# Patient Record
Sex: FEMALE | Race: BLACK OR AFRICAN AMERICAN | NOT HISPANIC OR LATINO | URBAN - METROPOLITAN AREA
[De-identification: names, ages, dates, MRNs, and addresses within clinical notes are randomized per-mention and may not be internally consistent; named-entity substitution may affect disease eponyms.]

---

## 2018-10-08 ENCOUNTER — EMERGENCY (EMERGENCY)
Facility: HOSPITAL | Age: 38
LOS: 0 days | Discharge: HOME | End: 2018-10-08
Attending: EMERGENCY MEDICINE

## 2018-10-08 VITALS
DIASTOLIC BLOOD PRESSURE: 77 MMHG | RESPIRATION RATE: 18 BRPM | TEMPERATURE: 97 F | OXYGEN SATURATION: 100 % | SYSTOLIC BLOOD PRESSURE: 140 MMHG | HEART RATE: 86 BPM

## 2018-10-08 DIAGNOSIS — E89.0 POSTPROCEDURAL HYPOTHYROIDISM: Chronic | ICD-10-CM

## 2018-10-08 DIAGNOSIS — J45.909 UNSPECIFIED ASTHMA, UNCOMPLICATED: ICD-10-CM

## 2018-10-08 DIAGNOSIS — R10.12 LEFT UPPER QUADRANT PAIN: ICD-10-CM

## 2018-10-08 DIAGNOSIS — R35.0 FREQUENCY OF MICTURITION: ICD-10-CM

## 2018-10-08 DIAGNOSIS — E89.0 POSTPROCEDURAL HYPOTHYROIDISM: ICD-10-CM

## 2018-10-08 DIAGNOSIS — R10.9 UNSPECIFIED ABDOMINAL PAIN: ICD-10-CM

## 2018-10-08 DIAGNOSIS — R42 DIZZINESS AND GIDDINESS: ICD-10-CM

## 2018-10-08 DIAGNOSIS — Z88.6 ALLERGY STATUS TO ANALGESIC AGENT: ICD-10-CM

## 2018-10-08 DIAGNOSIS — Z79.899 OTHER LONG TERM (CURRENT) DRUG THERAPY: ICD-10-CM

## 2018-10-08 DIAGNOSIS — Z98.890 OTHER SPECIFIED POSTPROCEDURAL STATES: ICD-10-CM

## 2018-10-08 DIAGNOSIS — M77.8 OTHER ENTHESOPATHIES, NOT ELSEWHERE CLASSIFIED: Chronic | ICD-10-CM

## 2018-10-08 DIAGNOSIS — Z91.018 ALLERGY TO OTHER FOODS: ICD-10-CM

## 2018-10-08 LAB
ALBUMIN SERPL ELPH-MCNC: 4.5 G/DL — SIGNIFICANT CHANGE UP (ref 3.5–5.2)
ALP SERPL-CCNC: 68 U/L — SIGNIFICANT CHANGE UP (ref 30–115)
ALT FLD-CCNC: 14 U/L — SIGNIFICANT CHANGE UP (ref 0–41)
ANION GAP SERPL CALC-SCNC: 12 MMOL/L — SIGNIFICANT CHANGE UP (ref 7–14)
APPEARANCE UR: CLEAR — SIGNIFICANT CHANGE UP
AST SERPL-CCNC: 14 U/L — SIGNIFICANT CHANGE UP (ref 0–41)
BASOPHILS # BLD AUTO: 0.04 K/UL — SIGNIFICANT CHANGE UP (ref 0–0.2)
BASOPHILS NFR BLD AUTO: 0.3 % — SIGNIFICANT CHANGE UP (ref 0–1)
BILIRUB DIRECT SERPL-MCNC: <0.2 MG/DL — SIGNIFICANT CHANGE UP (ref 0–0.2)
BILIRUB INDIRECT FLD-MCNC: SIGNIFICANT CHANGE UP MG/DL (ref 0.2–1.2)
BILIRUB SERPL-MCNC: <0.2 MG/DL — SIGNIFICANT CHANGE UP (ref 0.2–1.2)
BILIRUB UR-MCNC: NEGATIVE — SIGNIFICANT CHANGE UP
BUN SERPL-MCNC: 12 MG/DL — SIGNIFICANT CHANGE UP (ref 10–20)
CALCIUM SERPL-MCNC: 9.7 MG/DL — SIGNIFICANT CHANGE UP (ref 8.5–10.1)
CHLORIDE SERPL-SCNC: 100 MMOL/L — SIGNIFICANT CHANGE UP (ref 98–110)
CO2 SERPL-SCNC: 28 MMOL/L — SIGNIFICANT CHANGE UP (ref 17–32)
COLOR SPEC: YELLOW — SIGNIFICANT CHANGE UP
CREAT SERPL-MCNC: 0.8 MG/DL — SIGNIFICANT CHANGE UP (ref 0.7–1.5)
D DIMER BLD IA.RAPID-MCNC: 71 NG/ML DDU — SIGNIFICANT CHANGE UP (ref 0–230)
DIFF PNL FLD: NEGATIVE — SIGNIFICANT CHANGE UP
EOSINOPHIL # BLD AUTO: 0.15 K/UL — SIGNIFICANT CHANGE UP (ref 0–0.7)
EOSINOPHIL NFR BLD AUTO: 1.3 % — SIGNIFICANT CHANGE UP (ref 0–8)
GLUCOSE SERPL-MCNC: 113 MG/DL — HIGH (ref 70–99)
GLUCOSE UR QL: NEGATIVE MG/DL — SIGNIFICANT CHANGE UP
HCT VFR BLD CALC: 37.1 % — SIGNIFICANT CHANGE UP (ref 37–47)
HGB BLD-MCNC: 12.7 G/DL — SIGNIFICANT CHANGE UP (ref 12–16)
IMM GRANULOCYTES NFR BLD AUTO: 0.3 % — SIGNIFICANT CHANGE UP (ref 0.1–0.3)
KETONES UR-MCNC: NEGATIVE — SIGNIFICANT CHANGE UP
LACTATE SERPL-SCNC: 1.1 MMOL/L — SIGNIFICANT CHANGE UP (ref 0.5–2.2)
LEUKOCYTE ESTERASE UR-ACNC: NEGATIVE — SIGNIFICANT CHANGE UP
LIDOCAIN IGE QN: 23 U/L — SIGNIFICANT CHANGE UP (ref 7–60)
LYMPHOCYTES # BLD AUTO: 27.7 % — SIGNIFICANT CHANGE UP (ref 20.5–51.1)
LYMPHOCYTES # BLD AUTO: 3.31 K/UL — SIGNIFICANT CHANGE UP (ref 1.2–3.4)
MCHC RBC-ENTMCNC: 28 PG — SIGNIFICANT CHANGE UP (ref 27–31)
MCHC RBC-ENTMCNC: 34.2 G/DL — SIGNIFICANT CHANGE UP (ref 32–37)
MCV RBC AUTO: 81.7 FL — SIGNIFICANT CHANGE UP (ref 81–99)
MONOCYTES # BLD AUTO: 0.64 K/UL — HIGH (ref 0.1–0.6)
MONOCYTES NFR BLD AUTO: 5.4 % — SIGNIFICANT CHANGE UP (ref 1.7–9.3)
NEUTROPHILS # BLD AUTO: 7.76 K/UL — HIGH (ref 1.4–6.5)
NEUTROPHILS NFR BLD AUTO: 65 % — SIGNIFICANT CHANGE UP (ref 42.2–75.2)
NITRITE UR-MCNC: NEGATIVE — SIGNIFICANT CHANGE UP
PH UR: 5.5 — SIGNIFICANT CHANGE UP (ref 5–8)
PLATELET # BLD AUTO: 333 K/UL — SIGNIFICANT CHANGE UP (ref 130–400)
POTASSIUM SERPL-MCNC: 4.8 MMOL/L — SIGNIFICANT CHANGE UP (ref 3.5–5)
POTASSIUM SERPL-SCNC: 4.8 MMOL/L — SIGNIFICANT CHANGE UP (ref 3.5–5)
PROT SERPL-MCNC: 6.9 G/DL — SIGNIFICANT CHANGE UP (ref 6–8)
PROT UR-MCNC: NEGATIVE MG/DL — SIGNIFICANT CHANGE UP
RBC # BLD: 4.54 M/UL — SIGNIFICANT CHANGE UP (ref 4.2–5.4)
RBC # FLD: 12.8 % — SIGNIFICANT CHANGE UP (ref 11.5–14.5)
SODIUM SERPL-SCNC: 140 MMOL/L — SIGNIFICANT CHANGE UP (ref 135–146)
SP GR SPEC: 1.02 — SIGNIFICANT CHANGE UP (ref 1.01–1.03)
TYPE + AB SCN PNL BLD: SIGNIFICANT CHANGE UP
UROBILINOGEN FLD QL: 0.2 MG/DL — SIGNIFICANT CHANGE UP (ref 0.2–0.2)
WBC # BLD: 11.94 K/UL — HIGH (ref 4.8–10.8)
WBC # FLD AUTO: 11.94 K/UL — HIGH (ref 4.8–10.8)

## 2018-10-08 RX ORDER — MORPHINE SULFATE 50 MG/1
4 CAPSULE, EXTENDED RELEASE ORAL ONCE
Qty: 0 | Refills: 0 | Status: DISCONTINUED | OUTPATIENT
Start: 2018-10-08 | End: 2018-10-08

## 2018-10-08 RX ORDER — SODIUM CHLORIDE 9 MG/ML
1000 INJECTION INTRAMUSCULAR; INTRAVENOUS; SUBCUTANEOUS ONCE
Qty: 0 | Refills: 0 | Status: COMPLETED | OUTPATIENT
Start: 2018-10-08 | End: 2018-10-08

## 2018-10-08 RX ORDER — FAMOTIDINE 10 MG/ML
20 INJECTION INTRAVENOUS ONCE
Qty: 0 | Refills: 0 | Status: COMPLETED | OUTPATIENT
Start: 2018-10-08 | End: 2018-10-08

## 2018-10-08 RX ORDER — LEVOTHYROXINE SODIUM 125 MCG
1 TABLET ORAL
Qty: 0 | Refills: 0 | COMMUNITY

## 2018-10-08 RX ADMIN — MORPHINE SULFATE 4 MILLIGRAM(S): 50 CAPSULE, EXTENDED RELEASE ORAL at 21:01

## 2018-10-08 RX ADMIN — FAMOTIDINE 20 MILLIGRAM(S): 10 INJECTION INTRAVENOUS at 21:01

## 2018-10-08 RX ADMIN — SODIUM CHLORIDE 1000 MILLILITER(S): 9 INJECTION INTRAMUSCULAR; INTRAVENOUS; SUBCUTANEOUS at 21:33

## 2018-10-08 RX ADMIN — SODIUM CHLORIDE 2000 MILLILITER(S): 9 INJECTION INTRAMUSCULAR; INTRAVENOUS; SUBCUTANEOUS at 21:01

## 2018-10-08 RX ADMIN — MORPHINE SULFATE 4 MILLIGRAM(S): 50 CAPSULE, EXTENDED RELEASE ORAL at 23:01

## 2018-10-08 RX ADMIN — MORPHINE SULFATE 4 MILLIGRAM(S): 50 CAPSULE, EXTENDED RELEASE ORAL at 21:52

## 2018-10-08 RX ADMIN — Medication 30 MILLILITER(S): at 21:01

## 2018-10-08 NOTE — ED PROVIDER NOTE - PHYSICAL EXAMINATION
GEN: Nontoxic, appearing in pain.    HEAD:  Normocephalic, atraumatic.    EYES:  Clear conjunctivae without injection, drainage or discharge.    ENMT:  Moist MM.    NECK:  Supple, no masses. Normal ROM.    CARDIAC:  RRR, normal S1 and S2, no murmurs, rubs or gallops.    RESP:  Respiratory rate and effort appear normal; lungs are clear to auscultation bilaterally; no rhonchi, rales or wheezes.    ABDOMEN:  Soft, (+) LUQ abdominal tenderness (+) guarding no rebound, non-distended, no masses. Normal BS throughout.    MUSCULOSKELETAL: No leg swelling, no calf tenderness. Patient able to ambulate without difficulty.  NEURO:  AAO x 3. Motor 5/5. Sensory intact.     SKIN:  Normal skin color for age and race, well-perfused; warm and dry.

## 2018-10-08 NOTE — ED PROVIDER NOTE - OBJECTIVE STATEMENT
37 yo female with PMHx vonWillebrand disease, anemia, asthma, thyroidectomy on synthroid presents for LUQ abdominal pain x 4 days and dizziness beginning today. Patient sates pain is jenni and severe 10/10. LMP Sept 31. Patient finished course of Augmentin x 7 days 5 days ago for sinusitis which resolved. Patient was seen in [email or web address] 2 days ago and was found to have small amount of blood in urine and had non contrast abdominal CT which was negative. Pain has been worsening since then. Patient has been trying tums without relief (+) urinary frequency. Patient denies fevers, chest pain, SOB, urinary urgency, burning, nausea, vomiting, diarrhea, constipation, weakness, recent travel, leg pain/swelling, changes in PO intake, hematuria, changes in urine output, changes in mental status.

## 2018-10-08 NOTE — ED ADULT NURSE NOTE - NSIMPLEMENTINTERV_GEN_ALL_ED
Implemented All Universal Safety Interventions:  [place] to call system. Call bell, personal items and telephone within reach. Instruct patient to call for assistance. Room bathroom lighting operational. Non-slip footwear when patient is off stretcher. Physically safe environment: no spills, clutter or unnecessary equipment. Stretcher in lowest position, wheels locked, appropriate side rails in place.

## 2018-10-08 NOTE — ED PROVIDER NOTE - ATTENDING CONTRIBUTION TO CARE
37 yo f presents with 4 days of Left sided flank and abd pain.  NO nausea, no vomiting.  no vaginal bleeding, no diarrhea.  no headache, no neck pain.  pain has been worsening.  pt seent at another hospital 3 days ago and had noncontrast CT that was negative, hoever pain is worse.  no cp, no sob, no fevers, no headache.    awake, alert. no cva tenderness, lungs clear.  no LE swelling or calf tenderness.  abd soft with Left sided abd tenderness with guarding, no rebound.    p: ct w/IV contrast, labs, cxr, ivf, supportive care, reassess.

## 2018-10-08 NOTE — ED PROVIDER NOTE - CARE PROVIDER_API CALL
Radha Whipple), Gastroenterology  [address] [place] [address]  Phone: [phone]  Fax: [phone]

## 2018-10-08 NOTE — ED PROVIDER NOTE - NS ED ROS FT
Constitutional: No fevers/chills.    Head: No injury, headache.    Eyes:  No visual changes, eye pain or discharge. No injury.    ENMT:  No hearing changes, pain, discharge or infections. No neck pain or stiffness. No loss ROM.    Cardiac:  No chest pain, SOB or edema. No chest pain with exertion.    Respiratory:  No cough or respiratory distress.     GI:  No nausea, vomiting, diarrhea (+) abdominal pain. No anorexia. No change in PO intake.    :  (+) frequency, (-) urgency or burning. No change in urine output.    MS:  No leg pain, leg swelling, myalgia, muscle weakness, joint pain or back pain. No loss ROM.    Neuro:  (+) dizziness (-) weakness.  No LOC.    Skin:  No skin rash.

## 2018-10-09 VITALS — TEMPERATURE: 98 F

## 2018-10-09 LAB
CULTURE RESULTS: SIGNIFICANT CHANGE UP
SPECIMEN SOURCE: SIGNIFICANT CHANGE UP

## 2018-10-09 RX ORDER — METHOCARBAMOL 500 MG/1
1500 TABLET, FILM COATED ORAL ONCE
Qty: 0 | Refills: 0 | Status: COMPLETED | OUTPATIENT
Start: 2018-10-09 | End: 2018-10-09

## 2018-10-09 RX ORDER — LIDOCAINE 4 G/100G
10 CREAM TOPICAL ONCE
Qty: 0 | Refills: 0 | Status: COMPLETED | OUTPATIENT
Start: 2018-10-09 | End: 2018-10-09

## 2018-10-09 RX ORDER — METHOCARBAMOL 500 MG/1
2 TABLET, FILM COATED ORAL
Qty: 18 | Refills: 0 | OUTPATIENT
Start: 2018-10-09 | End: 2018-10-11

## 2018-10-09 RX ADMIN — LIDOCAINE 10 MILLILITER(S): 4 CREAM TOPICAL at 00:47

## 2018-10-09 RX ADMIN — MORPHINE SULFATE 4 MILLIGRAM(S): 50 CAPSULE, EXTENDED RELEASE ORAL at 00:03

## 2018-10-09 RX ADMIN — METHOCARBAMOL 1500 MILLIGRAM(S): 500 TABLET, FILM COATED ORAL at 00:31

## 2019-03-23 ENCOUNTER — EMERGENCY (EMERGENCY)
Facility: HOSPITAL | Age: 39
LOS: 0 days | Discharge: HOME | End: 2019-03-23
Admitting: PHYSICIAN ASSISTANT

## 2019-03-23 VITALS
RESPIRATION RATE: 18 BRPM | HEART RATE: 95 BPM | OXYGEN SATURATION: 97 % | DIASTOLIC BLOOD PRESSURE: 80 MMHG | SYSTOLIC BLOOD PRESSURE: 147 MMHG | TEMPERATURE: 98 F

## 2019-03-23 DIAGNOSIS — Z91.018 ALLERGY TO OTHER FOODS: ICD-10-CM

## 2019-03-23 DIAGNOSIS — J32.9 CHRONIC SINUSITIS, UNSPECIFIED: ICD-10-CM

## 2019-03-23 DIAGNOSIS — M77.8 OTHER ENTHESOPATHIES, NOT ELSEWHERE CLASSIFIED: Chronic | ICD-10-CM

## 2019-03-23 DIAGNOSIS — Z88.6 ALLERGY STATUS TO ANALGESIC AGENT: ICD-10-CM

## 2019-03-23 DIAGNOSIS — J40 BRONCHITIS, NOT SPECIFIED AS ACUTE OR CHRONIC: ICD-10-CM

## 2019-03-23 DIAGNOSIS — Z79.899 OTHER LONG TERM (CURRENT) DRUG THERAPY: ICD-10-CM

## 2019-03-23 DIAGNOSIS — R05 COUGH: ICD-10-CM

## 2019-03-23 DIAGNOSIS — E89.0 POSTPROCEDURAL HYPOTHYROIDISM: Chronic | ICD-10-CM

## 2019-03-23 RX ORDER — IPRATROPIUM/ALBUTEROL SULFATE 18-103MCG
3 AEROSOL WITH ADAPTER (GRAM) INHALATION
Qty: 0 | Refills: 0 | Status: COMPLETED | OUTPATIENT
Start: 2019-03-23 | End: 2019-03-23

## 2019-03-23 RX ORDER — IPRATROPIUM BROMIDE 0.2 MG/ML
0.5 SOLUTION, NON-ORAL INHALATION
Qty: 20 | Refills: 0 | OUTPATIENT
Start: 2019-03-23 | End: 2019-03-29

## 2019-03-23 RX ORDER — CLARITHROMYCIN 500 MG
1 TABLET ORAL
Qty: 14 | Refills: 0 | OUTPATIENT
Start: 2019-03-23 | End: 2019-03-29

## 2019-03-23 RX ADMIN — Medication 60 MILLIGRAM(S): at 18:13

## 2019-03-23 RX ADMIN — Medication 3 MILLILITER(S): at 18:29

## 2019-03-23 RX ADMIN — Medication 3 MILLILITER(S): at 18:13

## 2019-03-23 RX ADMIN — Medication 3 MILLILITER(S): at 18:10

## 2019-03-23 NOTE — ED PROVIDER NOTE - OBJECTIVE STATEMENT
39 female with PMHx chronic bronchitis, chronic sinusitis, presents c/o persistent cough, facial pain and nasal congestion x 1 month. Patient states earlier in month she was placed on Prednisone 20mg x 4 days, Amoxicillin without relief. She reports she is having difficulty expectorating. She states she also has been using daily Singulair, Claritin D, as directed by her ENT without relief. No SOB/CP, fever, chills. She reports multiple sick contacts at home and at work during the course of this month. She follows with ENT Dr. Marti and Pulm Dr. Block but has not seen either yet.

## 2019-03-23 NOTE — ED PROVIDER NOTE - CARE PROVIDERS DIRECT ADDRESSES
[email or web address]

## 2019-03-23 NOTE — ED PROVIDER NOTE - CLINICAL SUMMARY MEDICAL DECISION MAKING FREE TEXT BOX
Patient with PMHx chronic bronchitis/sinusitis, follows with ENT/Pulm but has yet to obtain appts, presents c/o persistent cough, bronchospasm, facial pain and thick, nasal discharge for 1 mth. Much improved breath sounds with Prednisone and multiple nebs. Patient amenable to dc and feels well.  I have discussed the discharge plan with the patient. The patient agrees with the plan, as discussed.  The patient understands Emergency Department diagnosis is a preliminary diagnosis often based on limited information and that the patient must adhere to the follow-up plan as discussed.  The patient understands that if the symptoms worsen or if prescribed medications do not have the desired/planned effect that the patient may return to the Emergency Department at any time for further evaluation and treatment.

## 2019-03-23 NOTE — ED PROVIDER NOTE - PHYSICAL EXAMINATION
CONST: Well appearing in NAD  EYES: PERRL, EOMI, Sclera and conjunctiva clear.   ENT: R frontal and maxillary sinus tenderness. No nasal discharge. TM's clear B/L without drainage. Oropharynx normal appearing, no erythema or exudates.   CARD: Normal S1 S2; Normal rate and rhythm  RESP: Equal BS B/L, mild expiratory wheezing  MS: Normal ROM in all extremities. No midline spinal tenderness.  SKIN: Warm, dry, no acute rashes. Good turgor  NEURO: A&Ox3, No focal deficits. Strength 5/5 with no sensory deficits.

## 2019-03-23 NOTE — ED PROVIDER NOTE - NS ED ROS FT
CONST: No fever, chills or bodyaches  EYES: No pain, redness, drainage or visual changes.  ENT: see HPI  CARD: No chest pain, palpitations  RESP: see HPI  GI: No abdominal pain, N/V/D  MS: No joint pain, back pain or extremity pain/injury  SKIN: No rashes  NEURO: No headache, paresthesias

## 2019-03-23 NOTE — ED PROVIDER NOTE - CARE PROVIDER_API CALL
Melissa Marti (MD)  Otolaryngology; Surgery  [address] [place] [address]  Phone: [phone]  Fax: [phone]  Follow Up Time:     Washington Vitale (DO)  Critical Care Medicine; Internal Medicine; Pulmonary Disease  [address] [place] [address]  Phone: [phone]  Fax: [phone]  Follow Up Time:

## 2019-03-24 PROBLEM — J45.909 UNSPECIFIED ASTHMA, UNCOMPLICATED: Chronic | Status: ACTIVE | Noted: 2018-10-08

## 2020-11-19 ENCOUNTER — EMERGENCY (EMERGENCY)
Facility: HOSPITAL | Age: 40
LOS: 0 days | Discharge: HOME | End: 2020-11-20
Attending: STUDENT IN AN ORGANIZED HEALTH CARE EDUCATION/TRAINING PROGRAM | Admitting: STUDENT IN AN ORGANIZED HEALTH CARE EDUCATION/TRAINING PROGRAM
Payer: COMMERCIAL

## 2020-11-19 VITALS — HEIGHT: 68 IN

## 2020-11-19 DIAGNOSIS — R07.89 OTHER CHEST PAIN: ICD-10-CM

## 2020-11-19 DIAGNOSIS — M77.8 OTHER ENTHESOPATHIES, NOT ELSEWHERE CLASSIFIED: Chronic | ICD-10-CM

## 2020-11-19 DIAGNOSIS — Z20.828 CONTACT WITH AND (SUSPECTED) EXPOSURE TO OTHER VIRAL COMMUNICABLE DISEASES: ICD-10-CM

## 2020-11-19 DIAGNOSIS — J45.909 UNSPECIFIED ASTHMA, UNCOMPLICATED: ICD-10-CM

## 2020-11-19 DIAGNOSIS — Z98.890 OTHER SPECIFIED POSTPROCEDURAL STATES: ICD-10-CM

## 2020-11-19 DIAGNOSIS — E89.0 POSTPROCEDURAL HYPOTHYROIDISM: Chronic | ICD-10-CM

## 2020-11-19 DIAGNOSIS — E03.9 HYPOTHYROIDISM, UNSPECIFIED: ICD-10-CM

## 2020-11-19 LAB
ALBUMIN SERPL ELPH-MCNC: 4.2 G/DL — SIGNIFICANT CHANGE UP (ref 3.5–5.2)
ALP SERPL-CCNC: 57 U/L — SIGNIFICANT CHANGE UP (ref 30–115)
ALT FLD-CCNC: 20 U/L — SIGNIFICANT CHANGE UP (ref 0–41)
ANION GAP SERPL CALC-SCNC: 11 MMOL/L — SIGNIFICANT CHANGE UP (ref 7–14)
APPEARANCE UR: CLEAR — SIGNIFICANT CHANGE UP
APTT BLD: 36.6 SEC — SIGNIFICANT CHANGE UP (ref 27–39.2)
AST SERPL-CCNC: 20 U/L — SIGNIFICANT CHANGE UP (ref 0–41)
BASE EXCESS BLDV CALC-SCNC: 3.1 MMOL/L — HIGH (ref -2–2)
BASOPHILS # BLD AUTO: 0.07 K/UL — SIGNIFICANT CHANGE UP (ref 0–0.2)
BASOPHILS NFR BLD AUTO: 0.6 % — SIGNIFICANT CHANGE UP (ref 0–1)
BILIRUB SERPL-MCNC: 0.4 MG/DL — SIGNIFICANT CHANGE UP (ref 0.2–1.2)
BILIRUB UR-MCNC: NEGATIVE — SIGNIFICANT CHANGE UP
BUN SERPL-MCNC: 8 MG/DL — LOW (ref 10–20)
CA-I SERPL-SCNC: 1.14 MMOL/L — SIGNIFICANT CHANGE UP (ref 1.12–1.3)
CALCIUM SERPL-MCNC: 9.2 MG/DL — SIGNIFICANT CHANGE UP (ref 8.5–10.1)
CHLORIDE SERPL-SCNC: 101 MMOL/L — SIGNIFICANT CHANGE UP (ref 98–110)
CO2 SERPL-SCNC: 25 MMOL/L — SIGNIFICANT CHANGE UP (ref 17–32)
COLOR SPEC: SIGNIFICANT CHANGE UP
CREAT SERPL-MCNC: 0.8 MG/DL — SIGNIFICANT CHANGE UP (ref 0.7–1.5)
D DIMER BLD IA.RAPID-MCNC: 23 NG/ML DDU — SIGNIFICANT CHANGE UP (ref 0–230)
DIFF PNL FLD: NEGATIVE — SIGNIFICANT CHANGE UP
EOSINOPHIL # BLD AUTO: 0.15 K/UL — SIGNIFICANT CHANGE UP (ref 0–0.7)
EOSINOPHIL NFR BLD AUTO: 1.4 % — SIGNIFICANT CHANGE UP (ref 0–8)
GAS PNL BLDV: 139 MMOL/L — SIGNIFICANT CHANGE UP (ref 136–145)
GAS PNL BLDV: SIGNIFICANT CHANGE UP
GLUCOSE SERPL-MCNC: 111 MG/DL — HIGH (ref 70–99)
GLUCOSE UR QL: NEGATIVE — SIGNIFICANT CHANGE UP
HCG SERPL QL: NEGATIVE — SIGNIFICANT CHANGE UP
HCO3 BLDV-SCNC: 28 MMOL/L — SIGNIFICANT CHANGE UP (ref 22–29)
HCT VFR BLD CALC: 37.8 % — SIGNIFICANT CHANGE UP (ref 37–47)
HCT VFR BLDA CALC: 39.7 % — SIGNIFICANT CHANGE UP (ref 34–44)
HGB BLD CALC-MCNC: 12.9 G/DL — LOW (ref 14–18)
HGB BLD-MCNC: 12.7 G/DL — SIGNIFICANT CHANGE UP (ref 12–16)
IMM GRANULOCYTES NFR BLD AUTO: 0.4 % — HIGH (ref 0.1–0.3)
INR BLD: 1.17 RATIO — SIGNIFICANT CHANGE UP (ref 0.65–1.3)
KETONES UR-MCNC: NEGATIVE — SIGNIFICANT CHANGE UP
LACTATE BLDV-MCNC: 1.2 MMOL/L — SIGNIFICANT CHANGE UP (ref 0.5–1.6)
LEUKOCYTE ESTERASE UR-ACNC: NEGATIVE — SIGNIFICANT CHANGE UP
LYMPHOCYTES # BLD AUTO: 3.56 K/UL — HIGH (ref 1.2–3.4)
LYMPHOCYTES # BLD AUTO: 32 % — SIGNIFICANT CHANGE UP (ref 20.5–51.1)
MAGNESIUM SERPL-MCNC: 2.3 MG/DL — SIGNIFICANT CHANGE UP (ref 1.8–2.4)
MCHC RBC-ENTMCNC: 28.5 PG — SIGNIFICANT CHANGE UP (ref 27–31)
MCHC RBC-ENTMCNC: 33.6 G/DL — SIGNIFICANT CHANGE UP (ref 32–37)
MCV RBC AUTO: 84.8 FL — SIGNIFICANT CHANGE UP (ref 81–99)
MONOCYTES # BLD AUTO: 0.57 K/UL — SIGNIFICANT CHANGE UP (ref 0.1–0.6)
MONOCYTES NFR BLD AUTO: 5.1 % — SIGNIFICANT CHANGE UP (ref 1.7–9.3)
NEUTROPHILS # BLD AUTO: 6.72 K/UL — HIGH (ref 1.4–6.5)
NEUTROPHILS NFR BLD AUTO: 60.5 % — SIGNIFICANT CHANGE UP (ref 42.2–75.2)
NITRITE UR-MCNC: NEGATIVE — SIGNIFICANT CHANGE UP
NRBC # BLD: 0 /100 WBCS — SIGNIFICANT CHANGE UP (ref 0–0)
NT-PROBNP SERPL-SCNC: 8 PG/ML — SIGNIFICANT CHANGE UP (ref 0–300)
PCO2 BLDV: 45 MMHG — SIGNIFICANT CHANGE UP (ref 41–51)
PH BLDV: 7.41 — SIGNIFICANT CHANGE UP (ref 7.26–7.43)
PH UR: 6 — SIGNIFICANT CHANGE UP (ref 5–8)
PLATELET # BLD AUTO: 347 K/UL — SIGNIFICANT CHANGE UP (ref 130–400)
PO2 BLDV: 75 MMHG — HIGH (ref 20–40)
POTASSIUM BLDV-SCNC: 4.1 MMOL/L — SIGNIFICANT CHANGE UP (ref 3.3–5.6)
POTASSIUM SERPL-MCNC: 4.4 MMOL/L — SIGNIFICANT CHANGE UP (ref 3.5–5)
POTASSIUM SERPL-SCNC: 4.4 MMOL/L — SIGNIFICANT CHANGE UP (ref 3.5–5)
PROT SERPL-MCNC: 6.7 G/DL — SIGNIFICANT CHANGE UP (ref 6–8)
PROT UR-MCNC: NEGATIVE — SIGNIFICANT CHANGE UP
PROTHROM AB SERPL-ACNC: 13.4 SEC — HIGH (ref 9.95–12.87)
RAPID RVP RESULT: SIGNIFICANT CHANGE UP
RBC # BLD: 4.46 M/UL — SIGNIFICANT CHANGE UP (ref 4.2–5.4)
RBC # FLD: 13.1 % — SIGNIFICANT CHANGE UP (ref 11.5–14.5)
SAO2 % BLDV: 94 % — SIGNIFICANT CHANGE UP
SARS-COV-2 RNA SPEC QL NAA+PROBE: SIGNIFICANT CHANGE UP
SODIUM SERPL-SCNC: 137 MMOL/L — SIGNIFICANT CHANGE UP (ref 135–146)
SP GR SPEC: 1.01 — SIGNIFICANT CHANGE UP (ref 1.01–1.03)
TROPONIN T SERPL-MCNC: <0.01 NG/ML — SIGNIFICANT CHANGE UP
UROBILINOGEN FLD QL: SIGNIFICANT CHANGE UP
WBC # BLD: 11.11 K/UL — HIGH (ref 4.8–10.8)
WBC # FLD AUTO: 11.11 K/UL — HIGH (ref 4.8–10.8)

## 2020-11-19 PROCEDURE — 99220: CPT

## 2020-11-19 PROCEDURE — 93010 ELECTROCARDIOGRAM REPORT: CPT

## 2020-11-19 PROCEDURE — 71045 X-RAY EXAM CHEST 1 VIEW: CPT | Mod: 26

## 2020-11-19 RX ORDER — METOPROLOL TARTRATE 50 MG
100 TABLET ORAL ONCE
Refills: 0 | Status: COMPLETED | OUTPATIENT
Start: 2020-11-19 | End: 2020-11-19

## 2020-11-19 RX ORDER — ACETAMINOPHEN 500 MG
650 TABLET ORAL ONCE
Refills: 0 | Status: COMPLETED | OUTPATIENT
Start: 2020-11-19 | End: 2020-11-19

## 2020-11-19 RX ADMIN — Medication 650 MILLIGRAM(S): at 20:27

## 2020-11-19 NOTE — ED PROVIDER NOTE - WR INTERPRETATION 1
CXR negative - No infiltrates, No consolidation, No atelectasis seenCXR negative - No CHF, No cardiomegaly, No pleural effusions

## 2020-11-19 NOTE — ED PROVIDER NOTE - CLINICAL SUMMARY MEDICAL DECISION MAKING FREE TEXT BOX
40yoF with h/o asthma, hypothyroidism, presents with feeling of body aches, weakness, x 10 days. Associated nonradiating L sided chest heaviness worse with exertion and SOB worse at night. Associated b/l LE edema but this is chronic and waxes/wanes. Denies fever, cough, urinary symptoms, vomiting, diarrhea, abdominal pain, recent long distance travel, COVID exposure, and all other symptoms. On exam, afebrile, hemodynamically stable, saturating well, NAD, well appearing, laying comfortably in bed, no WOB, speaking full sentences, head NCAT, EOMI grossly, anicteric, MMM, no JVD, RRR, nml S1/S2, no m/r/g, lungs CTAB with great air mvmt, no w/r/r, abd soft, NT, ND, nml BS, no rebound or guarding, AAO, CN's 3-12 grossly intact, LAZO spontaneously, symmetric shininess to b/l LE's with no overt edema, skin warm, well perfused, no rashes or hives. Character low suspicion for PE and no recent risk factors for this, and D-dimer. Character low suspicion for ACS and ECG unchanged from prior and trop negative. Character and exam low suspicion for CHF or cardiomyopathy. Character and context low suspicion for PNA. No e/o cellulitis, endocarditis, meningitis. Abdomen entirely benign with low suspicion for acute process. UA pending. Concern for COVID by symptoms. Patient well appearing, hemodynamically stable, no WOB. Sent to obs for ACS concern. 40yoF with h/o asthma, hypothyroidism, presents with feeling of body aches, weakness, x 10 days. Associated nonradiating L sided chest heaviness worse with exertion and SOB worse at night. Associated b/l LE edema but this is chronic and waxes/wanes. Denies fever, cough, urinary symptoms, vomiting, diarrhea, abdominal pain, recent long distance travel, COVID exposure, and all other symptoms. On exam, afebrile, hemodynamically stable, saturating well, NAD, well appearing, laying comfortably in bed, no WOB, speaking full sentences, head NCAT, EOMI grossly, anicteric, MMM, no JVD, RRR, nml S1/S2, no m/r/g, lungs CTAB with great air mvmt, no w/r/r, abd soft, NT, ND, nml BS, no rebound or guarding, AAO, CN's 3-12 grossly intact, LAZO spontaneously, symmetric shininess to b/l LE's with no overt edema, skin warm, well perfused, no rashes or hives. Character low suspicion for PE and no recent risk factors for this, and D-dimer. Character low suspicion for ACS and ECG unchanged from prior and trop negative. Character and exam low suspicion for CHF or cardiomyopathy. Character and context low suspicion for PNA. No e/o cellulitis, endocarditis, meningitis. Abdomen entirely benign with low suspicion for acute process. UA negative. COVID negative. Patient well appearing, hemodynamically stable, no WOB. Sent to obs for ACS concern.

## 2020-11-19 NOTE — ED ADULT NURSE NOTE - NS ED NURSE IV DC DT
Kay Ballesteros is a 70 year old female presenting with possible sinus infection x's 1 week. Patient states she has green drainage down the back of her nose. Patient states when she blows her nose it is yellow. Patient states she is exhausted. Patient states she does have shortness of breath on exertion. Patient states she had a temperature of 100 last week. Patient states she has had chills. Patient has tried OTC Mucinex with some relief.   Patient states on 7- she tested positive for strep, patient was placed on a Z-chantal.    Denies known Latex allergy or symptoms of Latex sensitivity.  Medications reviewed and updated.   20-Nov-2020 15:29

## 2020-11-19 NOTE — ED PROVIDER NOTE - OBJECTIVE STATEMENT
41 yo F pmh asthma, MVP, and hypothyroidism presenting to the ED c/o worsening SOB, left sided chest and back tightness, fatigue and body aches over the past 10 days. Pt reports she was seen at Urgent Care last Tuesday, COVID (-), started on Azithromycin and Prednisone, completed full course and is still feeling these symptoms. Pt reports worsening SOB when talking and walking, at night has been using Albuterol nebulizer. Pt describes left sided chest tightness, radiates to left upper back, worse with movement, took Tylenol with minimal relief. Pt also reports recent leg swelling x 2 days. 41 yo F pmh asthma, MVP, and hypothyroidism presenting to the ED c/o worsening SOB, left sided chest and back tightness, fatigue and body aches over the past 10 days. Pt reports she was seen at Urgent Care last Tuesday, COVID (-), started on Azithromycin and Prednisone, completed full course and is still feeling these symptoms. Pt reports worsening SOB when talking and walking, at night has been using Albuterol nebulizer. Pt describes left sided chest tightness, radiates to left upper back, worse with movement, took Tylenol with minimal relief. Pt admits to recent leg swelling x 2 days. Denies any use of OCP, smoking, history of clots, recent travel or surgery. Admits to family history of clotting disorder. Pt denies fever, chills, headache, dizziness, cough, dysuria, hematuria, nausea, vomiting, abdominal pain. 41 yo F pmh asthma, MVP, and hypothyroidism presenting to the ED c/o worsening SOB, left sided chest and back tightness, fatigue and body aches over the past 10 days. Pt reports she was seen at Urgent Care last Tuesday, COVID (-), started on Azithromycin and Prednisone, completed full course and is still feeling these symptoms. Pt reports worsening SOB when talking and walking, at night has been using Albuterol nebulizer. Pt describes left sided chest tightness, radiates to left upper back, worse with movement, took Tylenol with minimal relief. Pt admits to recent leg swelling x 2 days. Denies any use of OCP, smoking, history of clots, recent travel or surgery. Admits to family history of clotting disorder. Pt denies fever, chills, headache, dizziness, cough, sore throat, congestion, dysuria, hematuria, nausea, vomiting, abdominal pain.

## 2020-11-19 NOTE — ED PROVIDER NOTE - ATTENDING CONTRIBUTION TO CARE
40yoF with h/o asthma, hypothyroidism, presents with feeling of body aches, weakness, x 10 days. Associated nonradiating L sided chest heaviness worse with exertion and SOB worse at night. Associated b/l LE edema but this is chronic and waxes/wanes. Denies fever, cough, urinary symptoms, vomiting, diarrhea, abdominal pain, recent long distance travel, COVID exposure, and all other symptoms. On exam, afebrile, hemodynamically stable, saturating well, NAD, well appearing, laying comfortably in bed, no WOB, speaking full sentences, head NCAT, EOMI grossly, anicteric, MMM, no JVD, RRR, nml S1/S2, no m/r/g, lungs CTAB with great air mvmt, no w/r/r, abd soft, NT, ND, nml BS, no rebound or guarding, AAO, CN's 3-12 grossly intact, LAZO spontaneously, symmetric shininess to b/l LE's with no overt edema, skin warm, well perfused, no rashes or hives. Character low suspicion for PE and no recent risk factors for this, D-dimer pending. Character low suspicion for ACS and ECG unchanged from prior and trop __________. Character and exam low suspicion for CHF or cardiomyopathy. Character and context low suspicion for PNA. No e/o cellulitis, endocarditis, meningitis. Abdomen entirely benign with low suspicion for acute process. UA _____________. Concern for COVID by symptoms. 40yoF with h/o asthma, hypothyroidism, presents with feeling of body aches, weakness, x 10 days. Associated nonradiating L sided chest heaviness worse with exertion and SOB worse at night. Associated b/l LE edema but this is chronic and waxes/wanes. Denies fever, cough, urinary symptoms, vomiting, diarrhea, abdominal pain, recent long distance travel, COVID exposure, and all other symptoms. On exam, afebrile, hemodynamically stable, saturating well, NAD, well appearing, laying comfortably in bed, no WOB, speaking full sentences, head NCAT, EOMI grossly, anicteric, MMM, no JVD, RRR, nml S1/S2, no m/r/g, lungs CTAB with great air mvmt, no w/r/r, abd soft, NT, ND, nml BS, no rebound or guarding, AAO, CN's 3-12 grossly intact, LAZO spontaneously, symmetric shininess to b/l LE's with no overt edema, skin warm, well perfused, no rashes or hives. Character low suspicion for PE and no recent risk factors for this, and D-dimer. Character low suspicion for ACS and ECG unchanged from prior and trop __________. Character and exam low suspicion for CHF or cardiomyopathy. Character and context low suspicion for PNA. No e/o cellulitis, endocarditis, meningitis. Abdomen entirely benign with low suspicion for acute process. UA _____________. Concern for COVID by symptoms. 40yoF with h/o asthma, hypothyroidism, presents with feeling of body aches, weakness, x 10 days. Associated nonradiating L sided chest heaviness worse with exertion and SOB worse at night. Associated b/l LE edema but this is chronic and waxes/wanes. Denies fever, cough, urinary symptoms, vomiting, diarrhea, abdominal pain, recent long distance travel, COVID exposure, and all other symptoms. On exam, afebrile, hemodynamically stable, saturating well, NAD, well appearing, laying comfortably in bed, no WOB, speaking full sentences, head NCAT, EOMI grossly, anicteric, MMM, no JVD, RRR, nml S1/S2, no m/r/g, lungs CTAB with great air mvmt, no w/r/r, abd soft, NT, ND, nml BS, no rebound or guarding, AAO, CN's 3-12 grossly intact, LAZO spontaneously, symmetric shininess to b/l LE's with no overt edema, skin warm, well perfused, no rashes or hives. Character low suspicion for PE and no recent risk factors for this, and D-dimer. Character low suspicion for ACS and ECG unchanged from prior and trop negative. Character and exam low suspicion for CHF or cardiomyopathy. Character and context low suspicion for PNA. No e/o cellulitis, endocarditis, meningitis. Abdomen entirely benign with low suspicion for acute process. UA _____________. Concern for COVID by symptoms. 40yoF with h/o asthma, hypothyroidism, presents with feeling of body aches, weakness, x 10 days. Associated nonradiating L sided chest heaviness worse with exertion and SOB worse at night. Associated b/l LE edema but this is chronic and waxes/wanes. Denies fever, cough, urinary symptoms, vomiting, diarrhea, abdominal pain, recent long distance travel, COVID exposure, and all other symptoms. On exam, afebrile, hemodynamically stable, saturating well, NAD, well appearing, laying comfortably in bed, no WOB, speaking full sentences, head NCAT, EOMI grossly, anicteric, MMM, no JVD, RRR, nml S1/S2, no m/r/g, lungs CTAB with great air mvmt, no w/r/r, abd soft, NT, ND, nml BS, no rebound or guarding, AAO, CN's 3-12 grossly intact, LAZO spontaneously, symmetric shininess to b/l LE's with no overt edema, skin warm, well perfused, no rashes or hives. Character low suspicion for PE and no recent risk factors for this, and D-dimer. Character low suspicion for ACS and ECG unchanged from prior and trop negative. Character and exam low suspicion for CHF or cardiomyopathy. Character and context low suspicion for PNA. No e/o cellulitis, endocarditis, meningitis. Abdomen entirely benign with low suspicion for acute process. UA pending. Concern for COVID by symptoms. Patient well appearing, hemodynamically stable, no WOB. Sent to obs for ACS concern. 40yoF with h/o asthma, hypothyroidism, presents with feeling of body aches, weakness, x 10 days. Associated nonradiating L sided chest heaviness worse with exertion and SOB worse at night. Associated b/l LE edema but this is chronic and waxes/wanes. Denies fever, cough, urinary symptoms, vomiting, diarrhea, abdominal pain, recent long distance travel, COVID exposure, and all other symptoms. On exam, afebrile, hemodynamically stable, saturating well, NAD, well appearing, laying comfortably in bed, no WOB, speaking full sentences, head NCAT, EOMI grossly, anicteric, MMM, no JVD, RRR, nml S1/S2, no m/r/g, lungs CTAB with great air mvmt, no w/r/r, abd soft, NT, ND, nml BS, no rebound or guarding, AAO, CN's 3-12 grossly intact, LAZO spontaneously, symmetric shininess to b/l LE's with no overt edema, skin warm, well perfused, no rashes or hives. Character low suspicion for PE and no recent risk factors for this, and D-dimer. Character low suspicion for ACS and ECG unchanged from prior and trop negative. Character and exam low suspicion for CHF or cardiomyopathy. Character and context low suspicion for PNA. No e/o cellulitis, endocarditis, meningitis. Abdomen entirely benign with low suspicion for acute process. UA negative. COVID negative. Patient well appearing, hemodynamically stable, no WOB. Sent to obs for ACS concern.

## 2020-11-19 NOTE — ED CDU PROVIDER INITIAL DAY NOTE - OBJECTIVE STATEMENT
40 yold female to ED Pmhx Asthma, hypothyroidism c/o mild left side cp started 1 week ago intermittent dull with body aches and pains, sob worse tonight; pt seen in local ucc covid neg; given abx/steroids with improvement but cp still persistant; remote hx dvt/pe cousins; deneis fmhx cad, cva/sudden death; denies dm, htn, hld or smoking

## 2020-11-19 NOTE — ED CDU PROVIDER INITIAL DAY NOTE - PROGRESS NOTE DETAILS
received signout from Iván Longoria pt in obs for cp eval; will follow obs cp protocol and plan for ccta in am;

## 2020-11-19 NOTE — ED ADULT TRIAGE NOTE - CHIEF COMPLAINT QUOTE
Pt c/o of chest tightness and discomfort that started today. Pain is L sided and radiates to midsternum.  Pt c/o fatigue and body aches x 1 week. Pt reports she had a rapid - Covid test last week.

## 2020-11-19 NOTE — ED ADULT NURSE NOTE - OBJECTIVE STATEMENT
Pt c/o generalized body aches/fatigue x 1 week. Today, pt c/o chest tightness. Pt reports she was swabbed for Covid last week and result was negative.

## 2020-11-19 NOTE — ED CDU PROVIDER INITIAL DAY NOTE - MEDICAL DECISION MAKING DETAILS
40F with PMH asthma, MVP, and hypothyroidism presenting to the ED c/o worsening SOB, left sided chest and back tightness, fatigue and body aches. She had neg covid test done at urgent care. Denies smoking Hx, OCP, or other PE RF. Gen - NAD, Head - NCAT, TMs - clear b/l, Pharynx - clear, MMM, Heart - RRR, no m/g/r, Lungs - CTAB, no w/c/r, Abdomen - soft, NT, ND, Skin - No rash, Extremities - FROM, no edema, erythema, ecchymosis, Neuro - CN 2-12 intact, nl strength and sensation, nl gait. EKG non-ischemic, CXR wnl, trop neg x1, covid test here negative. pt placed in obs for CCTA and re-eval.

## 2020-11-19 NOTE — ED PROVIDER NOTE - PHYSICAL EXAMINATION
CONST: Well appearing in NAD, comfortable, speaking full sentences  EYES: PERRL, EOMI, Sclera and conjunctiva clear.   NECK: Non-tender, Full ROM, no meningeal signs  CARD: (+)left sided chest wall reproducible tenderness. Normal S1 S2; Normal rate and rhythm  RESP: Equal BS B/L, No wheezes, rhonchi or rales. No distress, no accessory muscle use.   GI: Soft, non-tender, non-distended.  MS: Normal ROM in all extremities. (+)reproducible left sided upper back tenderness. No midline spinal tenderness.  SKIN: Warm, dry, no acute rashes. Good turgor  NEURO: A&Ox3, No focal deficits. Strength 5/5 with no sensory deficits. Steady gait

## 2020-11-19 NOTE — ED PROVIDER NOTE - NS ED ROS FT
Constitutional: (-) fever (+) malaise (-) diaphoresis (-) chills (-) wt. loss (+) bodyaches (-) night sweats  Eyes: (-) visual changes (-) eye pain (-) eye discharge (-) photophobia (-) FB sensation  ENMT: (-) nasal or chest congestion (-) runny nose (-) sore throat (-) hoarseness  (-) hearing changes (-) ear pain (-) ear discharge or infections (-) neck pain (-) neck stiffness  Cardiac: (-) chest pain  (-) palpitations (-) syncope (-) edema  Respiratory: (-) cough (-) hemoptysis (-) history of asthma or COPD (-) SOB (-) MALCOL[place]  GI: (-) nausea (-) vomiting (-) diarrhea (-) abdominal pain (-) hematochezia (-) changes in appetite (-) hx of nephrolithiasis  : (-) dysuria (-) increased frequency  (-) hematuria (-) incontinence  MS: (-) back pain (-) myalgia (-) muscle weakness (-)  joint pain  Neuro: (-) headache (-) dizziness (-) numbness/tingling to extremities B/L (-) weakness (-) LOC (-) head injury (-) AMS (-) sadlde anesthesia (-) tremors  Skin: (-) rash (-) laceration  Psychiatric: (-) hallucinations (-) SI/HI (-) intoxication  GYN: (-) pelvic pain (-) abnormal bleeding (-) abnormal discharge or odor (-) hx of STDs (-) OCP use (-) hx of fibroids/cysts (-) hx of pregnancy (-) hx of mammo (>40)  LMP:  OB: (-)  (-) C/S (-) complications of delivery. GA:  Except as documented in the HPI, all other systems are negative. Constitutional: (+)fatigue (+)body aches (+)weakness. No fever, chills.  Eyes:  No visual changes  ENMT:  No neck pain, no congestion, sore throat  Cardiac:  (+) chest pain  Respiratory: (+)SOB. No cough  GI:  No nausea, vomiting, diarrhea, abdominal pain.  :  No dysuria, hematuria  MS:  (+) back pain (+)leg swelling  Neuro:  No headache or lightheadedness  Except as documented in the HPI,  all other systems are negative.

## 2020-11-20 VITALS
TEMPERATURE: 98 F | OXYGEN SATURATION: 99 % | HEART RATE: 77 BPM | RESPIRATION RATE: 18 BRPM | DIASTOLIC BLOOD PRESSURE: 65 MMHG | SYSTOLIC BLOOD PRESSURE: 128 MMHG

## 2020-11-20 LAB — TROPONIN T SERPL-MCNC: <0.01 NG/ML — SIGNIFICANT CHANGE UP

## 2020-11-20 PROCEDURE — 93010 ELECTROCARDIOGRAM REPORT: CPT

## 2020-11-20 PROCEDURE — 99217: CPT

## 2020-11-20 PROCEDURE — 75574 CT ANGIO HRT W/3D IMAGE: CPT | Mod: 26

## 2020-11-20 RX ORDER — ACETAMINOPHEN 500 MG
650 TABLET ORAL ONCE
Refills: 0 | Status: COMPLETED | OUTPATIENT
Start: 2020-11-20 | End: 2020-11-20

## 2020-11-20 RX ORDER — METOPROLOL TARTRATE 50 MG
100 TABLET ORAL ONCE
Refills: 0 | Status: COMPLETED | OUTPATIENT
Start: 2020-11-20 | End: 2020-11-20

## 2020-11-20 RX ORDER — METOPROLOL TARTRATE 50 MG
50 TABLET ORAL ONCE
Refills: 0 | Status: COMPLETED | OUTPATIENT
Start: 2020-11-20 | End: 2020-11-20

## 2020-11-20 RX ADMIN — Medication 100 MILLIGRAM(S): at 10:40

## 2020-11-20 RX ADMIN — Medication 100 MILLIGRAM(S): at 00:14

## 2020-11-20 RX ADMIN — Medication 650 MILLIGRAM(S): at 10:41

## 2020-11-20 RX ADMIN — Medication 650 MILLIGRAM(S): at 09:15

## 2020-11-20 RX ADMIN — Medication 50 MILLIGRAM(S): at 07:54

## 2020-11-20 NOTE — ED CDU PROVIDER DISPOSITION NOTE - PATIENT PORTAL LINK FT
You can access the FollowMyHealth Patient Portal offered by [facility] by registering at the following website: [email or web address]. By joining [employer]’s FollowMyHealth portal, you will also be able to view your health information using other applications (apps) compatible with our system.

## 2020-11-20 NOTE — ED CDU PROVIDER SUBSEQUENT DAY NOTE - MEDICAL DECISION MAKING DETAILS
40F with PMH asthma, MVP, and hypothyroidism presenting to the ED c/o worsening SOB, left sided chest and back tightness, fatigue and body aches. She had neg covid test done at urgent care. Denies smoking Hx, OCP, or other PE RF. Gen - NAD, Head - NCAT, TMs - clear b/l, Pharynx - clear, MMM, Heart - RRR, no m/g/r, Lungs - CTAB, no w/c/r, Abdomen - soft, NT, ND, Skin - No rash, Extremities - FROM, no edema, erythema, ecchymosis, Neuro - CN 2-12 intact, nl strength and sensation, nl gait. EKG non-ischemic, CXR wnl, trop neg x2, covid test here negative.   CCTA Grossly unremarkable coronary arteries.  CAD-RADS 0. Pt feels well, no active CP and was given copies of all results for this visit thus far. I have fully discussed the medical management and delivery of care with the patient. I have discussed any available labs, imaging and treatment options with the patient. All Questions answered at the bedside. Patient confirms understanding and has been given detailed return precautions. Patient instructed to return to the ED should symptoms persist or worsen. Patient has demonstrated capacity and has verbalized understanding. Patient is well appearing upon discharge, ambulatory with a steady gait. 40F with PMH asthma, MVP, and hypothyroidism presenting to the ED c/o worsening SOB, left sided chest and back tightness, fatigue and body aches. She had neg covid test done at urgent care. Denies smoking Hx, OCP, or other PE RF. Gen - NAD, Head - NCAT, TMs - clear b/l, Pharynx - clear, MMM, Heart - RRR, no m/g/r, Lungs - CTAB, no w/c/r, Abdomen - soft, NT, ND, Skin - No rash, Extremities - FROM, no edema, erythema, ecchymosis, Neuro - CN 2-12 intact, nl strength and sensation, nl gait. EKG non-ischemic, CXR wnl, trop neg x2, covid test here negative. pt placed in obs for CCTA and re-eval.

## 2020-11-20 NOTE — ED CDU PROVIDER DISPOSITION NOTE - CLINICAL COURSE
· Medical Decision Making Details: 40F with PMH asthma, MVP, and hypothyroidism presenting to the ED c/o worsening SOB, left sided chest and back tightness, fatigue and body aches. She had neg covid test done at urgent care. Denies smoking Hx, OCP, or other PE RF. Gen - NAD, Head - NCAT, TMs - clear b/l, Pharynx - clear, MMM, Heart - RRR, no m/g/r, Lungs - CTAB, no w/c/r, Abdomen - soft, NT, ND, Skin - No rash, Extremities - FROM, no edema, erythema, ecchymosis, Neuro - CN 2-12 intact, nl strength and sensation, nl gait. EKG non-ischemic, CXR wnl, trop neg x2, covid test here negative.   CCTA Grossly unremarkable coronary arteries.  CAD-RADS 0. Pt feels well, no active CP and was given copies of all results for this visit thus far. I have fully discussed the medical management and delivery of care with the patient. I have discussed any available labs, imaging and treatment options with the patient. All Questions answered at the bedside. Patient confirms understanding and has been given detailed return precautions. Patient instructed to return to the ED should symptoms persist or worsen. Patient has demonstrated capacity and has verbalized understanding. Patient is well appearing upon discharge, ambulatory with a steady gait. 40F with PMH asthma, MVP, and hypothyroidism presenting to the ED c/o worsening SOB, left sided chest and back tightness, fatigue and body aches. She had neg covid test done at urgent care. Denies smoking Hx, OCP, or other PE RF. Gen - NAD, Head - NCAT, TMs - clear b/l, Pharynx - clear, MMM, Heart - RRR, no m/g/r, Lungs - CTAB, no w/c/r, Abdomen - soft, NT, ND, Skin - No rash, Extremities - FROM, no edema, erythema, ecchymosis, Neuro - CN 2-12 intact, nl strength and sensation, nl gait. EKG non-ischemic, CXR wnl, trop neg x2, covid test here negative.   CCTA Grossly unremarkable coronary arteries.  CAD-RADS 0. Pt feels well, no active CP and was given copies of all results for this visit thus far. I have fully discussed the medical management and delivery of care with the patient. I have discussed any available labs, imaging and treatment options with the patient. All Questions answered at the bedside. Patient confirms understanding and has been given detailed return precautions. Patient instructed to return to the ED should symptoms persist or worsen. Patient has demonstrated capacity and has verbalized understanding. Patient is well appearing upon discharge, ambulatory with a steady gait.

## 2020-11-20 NOTE — ED CDU PROVIDER DISPOSITION NOTE - CARE PROVIDER_API CALL
Garrick Jung)  Cardiovascular Disease; Internal Medicine  [address] [place] [address]  Phone: [phone]  Fax: [phone]  Follow Up Time:

## 2020-11-20 NOTE — ED CDU PROVIDER SUBSEQUENT DAY NOTE - HISTORY
pt resting in obs without complaints; covid rvp negative; 2nd ce/ekg ordered; toprol given; will plan for ccta in am;

## 2020-11-20 NOTE — CHART NOTE - NSCHARTNOTEFT_GEN_A_CORE
Pt is a 40 year old female who presented to the ED with a c/o chest discomfort.  Pt reported that her PCP is Bisi Schwartz.  Pt denied needs and declined assistance from JENNA.

## 2020-11-20 NOTE — CHART NOTE - NSCHARTNOTEFT_GEN_A_CORE
Pt is a 40 y old female to ED [facility] Asthma, hypothyroidism c/o mild left side cp started 1 week ago intermittent dull with body aches, sob and pains.  Pt will go home at the point of discharge.  Pt currently has no known case management needs.  SW provided pt with supportive counseling.  Pt denies any financial, legal, housing, or other social issues.  Case management continues to be available if needs were to arise for pt.  Pt wants to drive herself home at the point of discharge.

## 2020-11-20 NOTE — ED ADULT NURSE REASSESSMENT NOTE - NS ED NURSE REASSESS COMMENT FT1
pt resting comfortably in bed. VSS. pt denies any pain or discomfort. cardiac monitoring continued. will continue to monitor.
pt resting comfortably in bed. denies any pain or discomfort at this time. VSS. cardiac monitoring continued. will continue to monitor.
received pt from previous RN. pt resting comfortably in bed. pt denies any pain or discomfort at this time. pt on cardiac monitor. will continue to monitor.
Pt assessed, pt appears comfortable, pt ambulated to bathroom, metoprolol given to patient, cardiac monitor maintained, pt awaiting for CCTA.

## 2020-11-21 LAB
CULTURE RESULTS: SIGNIFICANT CHANGE UP
SPECIMEN SOURCE: SIGNIFICANT CHANGE UP

## 2021-01-21 ENCOUNTER — EMERGENCY (EMERGENCY)
Facility: HOSPITAL | Age: 41
LOS: 0 days | Discharge: HOME | End: 2021-01-21
Attending: EMERGENCY MEDICINE | Admitting: EMERGENCY MEDICINE
Payer: COMMERCIAL

## 2021-01-21 VITALS
HEIGHT: 68 IN | RESPIRATION RATE: 18 BRPM | DIASTOLIC BLOOD PRESSURE: 72 MMHG | SYSTOLIC BLOOD PRESSURE: 147 MMHG | HEART RATE: 99 BPM | TEMPERATURE: 99 F | WEIGHT: 248.02 LBS | OXYGEN SATURATION: 97 %

## 2021-01-21 DIAGNOSIS — Z79.52 LONG TERM (CURRENT) USE OF SYSTEMIC STEROIDS: ICD-10-CM

## 2021-01-21 DIAGNOSIS — Z88.1 ALLERGY STATUS TO OTHER ANTIBIOTIC AGENTS STATUS: ICD-10-CM

## 2021-01-21 DIAGNOSIS — U07.1 COVID-19: ICD-10-CM

## 2021-01-21 DIAGNOSIS — R53.1 WEAKNESS: ICD-10-CM

## 2021-01-21 DIAGNOSIS — M77.8 OTHER ENTHESOPATHIES, NOT ELSEWHERE CLASSIFIED: Chronic | ICD-10-CM

## 2021-01-21 DIAGNOSIS — Z98.890 OTHER SPECIFIED POSTPROCEDURAL STATES: ICD-10-CM

## 2021-01-21 DIAGNOSIS — Z79.01 LONG TERM (CURRENT) USE OF ANTICOAGULANTS: ICD-10-CM

## 2021-01-21 DIAGNOSIS — I10 ESSENTIAL (PRIMARY) HYPERTENSION: ICD-10-CM

## 2021-01-21 DIAGNOSIS — Z79.899 OTHER LONG TERM (CURRENT) DRUG THERAPY: ICD-10-CM

## 2021-01-21 DIAGNOSIS — Z91.018 ALLERGY TO OTHER FOODS: ICD-10-CM

## 2021-01-21 DIAGNOSIS — J45.909 UNSPECIFIED ASTHMA, UNCOMPLICATED: ICD-10-CM

## 2021-01-21 DIAGNOSIS — E89.0 POSTPROCEDURAL HYPOTHYROIDISM: Chronic | ICD-10-CM

## 2021-01-21 PROBLEM — Z87.09 PERSONAL HISTORY OF OTHER DISEASES OF THE RESPIRATORY SYSTEM: Chronic | Status: ACTIVE | Noted: 2020-11-19

## 2021-01-21 PROBLEM — D68.9 COAGULATION DEFECT, UNSPECIFIED: Chronic | Status: ACTIVE | Noted: 2020-11-19

## 2021-01-21 PROCEDURE — 99285 EMERGENCY DEPT VISIT HI MDM: CPT

## 2021-01-21 PROCEDURE — 93010 ELECTROCARDIOGRAM REPORT: CPT

## 2021-01-21 PROCEDURE — 93970 EXTREMITY STUDY: CPT | Mod: 26

## 2021-01-21 PROCEDURE — 71045 X-RAY EXAM CHEST 1 VIEW: CPT | Mod: 26

## 2021-01-21 NOTE — ED ADULT TRIAGE NOTE - HEIGHT IN CM
Patient has an ingrown toenail of her left middle toe.  No infection at this time but will prescribe Bactrim in case it worsens.    Discussed soaking toe and then using cotton or floss to help bring nail up.  If not successful, patient will call and refer to podiatry.   [phone]

## 2021-01-21 NOTE — ED ADULT NURSE NOTE - NSIMPLEMENTINTERV_GEN_ALL_ED
Implemented All Universal Safety Interventions:  [place] to call system. Call bell, personal items and telephone within reach. Instruct patient to call for assistance. Room bathroom lighting operational. Non-slip footwear when patient is off stretcher. Physically safe environment: no spills, clutter or unnecessary equipment. Stretcher in lowest position, wheels locked, appropriate side rails in place.

## 2021-01-21 NOTE — ED PROVIDER NOTE - NS ED ROS FT
Constitutional: (+) fatigue, (+) fever  Eyes/ENT: (-) blurry vision, (-) epistaxis  Cardiovascular: (-) chest pain, (-) syncope  Respiratory: (+) cough, (+) shortness of breath  Gastrointestinal: (-) vomiting, (-) diarrhea  : (-) dysuria, (-) hematuria  Musculoskeletal: (+) calf pain, (-) neck pain, (-) back pain, (-) joint pain  Integumentary: (-) rash, (-) edema  Neurological: (-) headache, (-) altered mental status  Allergic/Immunologic: (-) pruritus

## 2021-01-21 NOTE — ED PROVIDER NOTE - PATIENT PORTAL LINK FT
You can access the FollowMyHealth Patient Portal offered by [facility] by registering at the following website: [email or web address]. By joining [employer]’s FollowMyHealth portal, you will also be able to view your health information using other applications (apps) compatible with our system.

## 2021-01-21 NOTE — ED PROVIDER NOTE - CLINICAL SUMMARY MEDICAL DECISION MAKING FREE TEXT BOX
41yo F, covid +ve last month with continued intermittent symptoms presents for calf pain and mild SOB. CXR clear. Duplex negative for DVT. Recent -ve cardiac workup including CCTA. F/u PMD. Return precautions given

## 2021-01-21 NOTE — ED PROVIDER NOTE - ATTENDING CONTRIBUTION TO CARE
41yo F with PMHx HTN, asthma, von Willebrand's disease, thyroidectomy, presents for eval of RLE calf pain, mild-moderate, nontraumatic, nonradiating. Pt states began having covid symptoms on 12/21/20 including SOB, cough, myalgia, fatigue, weakness, chills, congestion, loss of taste/smell. Tested +ve for covid on 12/31/21. Took a zpack during this time. States most symptoms have since improved. She states began having SOB and chest pain recently, went to [facility], had normal CXR, then saw PMD who started her on prednisone. Today began having calf pain, still has mild SOB. No leg swelling, OCP/hormone use, recent prolonged immobilization (including surgery, trauma, bedrest, plane travel), h/o DVT/PE, h/o malignancy. Denies headache, nausea, vomiting, diarrhea, abd pain, dysuria. States has had normal stress test and echo in the past.     Vital signs reviewed  GENERAL: Patient nontoxic appearing, NAD  HEAD: NCAT  EYES: Anicteric  ENT: MMM  RESPIRATORY: Normal respiratory effort. CTA B/L. No wheezing, rales, rhonchi  CARDIOVASCULAR: Regular rate and rhythm  ABDOMEN: Soft. Nondistended. Nontender  MUSCULOSKELETAL/EXTREMITIES: Brisk cap refill. Equal radial pulses. No leg edema or calf tenderness. FROM of extremities.   SKIN:  Warm and dry  NEURO: AAOx3. No gross FND

## 2021-01-21 NOTE — ED PROVIDER NOTE - OBJECTIVE STATEMENT
40y F pmh asthma, covid (+) presents for eval of fatigue. Pt has generalized body fatigue x2wks since being diagnosed with covid-19, associated episodes of sob, cough, fever. Today she developed mild aching pain to b/l calfs, no aggravating or relieving factors. Denies ha, cp, weakness, numbness, n/v/d/c, dysuria

## 2022-02-15 NOTE — ED PROVIDER NOTE - CARE PROVIDERS DIRECT ADDRESSES
Attempted to contact patient regarding MRA results and to inquire about outstanding w/u including colonoscopy and stress test. Left VM   ,DirectAddress_Unknown

## 2022-06-29 NOTE — ED PROVIDER NOTE - PROGRESS NOTE DETAILS
[[id number]] Pt signed out to Dr. Burgess to follow up official CT result, and to re assess after pain medications. I received signout. Pending CT read. Discussed CT and lab findings with patient. Given history of round-the-clock daily NSAID use over prior weeks, Recommended stopping NSAIDs, starting Anti-acid medication, and giving GI f/u.

## 2022-08-08 NOTE — ED CDU PROVIDER INITIAL DAY NOTE - PHYSICAL EXAMINATION
Constitutional: Well developed, well nourished. NAD  Head: Normocephalic, atraumatic.  Eyes: PERRL, EOMI.  ENT: No nasal discharge. Mucous membranes dry.  Neck: Supple. Painless ROM.  Cardiovascular:  Regular rate and rhythm.  Pulmonary:  Lungs clear to auscultation bilaterally.   Abdominal: Soft. Nondistended. No rebound, guarding, rigidity.  Extremities. Pelvis stable. No lower extremity edema, symmetric calves.  Skin: No rashes, cyanosis.  Neuro: AAOx3. No focal neurological deficits.  Psych: Normal mood. Normal affect. Other (Free Text): No charge per Susan Palacios RN Price $ (Use Numbers Only, No Text Please.): 0 Sticky Other (Free Text): Residual Restylane KYSSE \\nLot: [id number]\\nExp: 03/2023\\n0.15 cc to lips Detail Level: Simple

## 2023-09-18 NOTE — ED PROVIDER NOTE - NSTIMEPROVIDERCAREINITIATE_GEN_ER
08-Oct-2018 19:58
    Afebrile. Hemodynamically Stable.   AOx3. Resting comfortably.   No respiratory distress.   Intact distal pulses.   Abdomen s/nt/nd.   Extremities warm to touch.